# Patient Record
Sex: FEMALE | Race: WHITE | NOT HISPANIC OR LATINO
[De-identification: names, ages, dates, MRNs, and addresses within clinical notes are randomized per-mention and may not be internally consistent; named-entity substitution may affect disease eponyms.]

---

## 2018-11-08 ENCOUNTER — RESULT REVIEW (OUTPATIENT)
Age: 68
End: 2018-11-08

## 2019-04-04 ENCOUNTER — RESULT REVIEW (OUTPATIENT)
Age: 69
End: 2019-04-04

## 2020-05-10 ENCOUNTER — TRANSCRIPTION ENCOUNTER (OUTPATIENT)
Age: 70
End: 2020-05-10

## 2020-12-11 ENCOUNTER — RESULT REVIEW (OUTPATIENT)
Age: 70
End: 2020-12-11

## 2021-07-26 ENCOUNTER — TRANSCRIPTION ENCOUNTER (OUTPATIENT)
Age: 71
End: 2021-07-26

## 2021-08-10 ENCOUNTER — TRANSCRIPTION ENCOUNTER (OUTPATIENT)
Age: 71
End: 2021-08-10

## 2021-09-27 ENCOUNTER — TRANSCRIPTION ENCOUNTER (OUTPATIENT)
Age: 71
End: 2021-09-27

## 2021-12-01 ENCOUNTER — TRANSCRIPTION ENCOUNTER (OUTPATIENT)
Age: 71
End: 2021-12-01

## 2021-12-23 ENCOUNTER — TRANSCRIPTION ENCOUNTER (OUTPATIENT)
Age: 71
End: 2021-12-23

## 2021-12-27 ENCOUNTER — TRANSCRIPTION ENCOUNTER (OUTPATIENT)
Age: 71
End: 2021-12-27

## 2022-02-26 ENCOUNTER — TRANSCRIPTION ENCOUNTER (OUTPATIENT)
Age: 72
End: 2022-02-26

## 2022-06-14 ENCOUNTER — NON-APPOINTMENT (OUTPATIENT)
Age: 72
End: 2022-06-14

## 2022-08-15 ENCOUNTER — LABORATORY RESULT (OUTPATIENT)
Age: 72
End: 2022-08-15

## 2022-08-15 ENCOUNTER — RESULT REVIEW (OUTPATIENT)
Age: 72
End: 2022-08-15

## 2022-08-15 ENCOUNTER — APPOINTMENT (OUTPATIENT)
Dept: HEMATOLOGY ONCOLOGY | Facility: CLINIC | Age: 72
End: 2022-08-15

## 2022-08-15 VITALS
HEIGHT: 66 IN | BODY MASS INDEX: 18.16 KG/M2 | OXYGEN SATURATION: 99 % | SYSTOLIC BLOOD PRESSURE: 90 MMHG | RESPIRATION RATE: 18 BRPM | DIASTOLIC BLOOD PRESSURE: 47 MMHG | WEIGHT: 113 LBS | TEMPERATURE: 99.7 F | HEART RATE: 78 BPM

## 2022-08-15 DIAGNOSIS — Z86.39 PERSONAL HISTORY OF OTHER ENDOCRINE, NUTRITIONAL AND METABOLIC DISEASE: ICD-10-CM

## 2022-08-15 DIAGNOSIS — Z78.9 OTHER SPECIFIED HEALTH STATUS: ICD-10-CM

## 2022-08-15 PROBLEM — Z00.00 ENCOUNTER FOR PREVENTIVE HEALTH EXAMINATION: Status: ACTIVE | Noted: 2022-08-15

## 2022-08-15 PROCEDURE — 99205 OFFICE O/P NEW HI 60 MIN: CPT

## 2022-08-17 NOTE — RESULTS/DATA
[FreeTextEntry1] : 8/15/2022\par Accompanied labs (from 8/3/2022) reviewed. \par Hgb 10.1, MCV 97.5, WBC 8.1,  (reactive) \par Ferritin 65\par Iron 28\par TIBC 289 \par Iron sat 10%\par Transferrin 231 \par \par \par

## 2022-08-17 NOTE — HISTORY OF PRESENT ILLNESS
[de-identified] : (8/15/2022) /72 year-old Ms. STUART is seen in consult for anemia.Patient has ulcerative colitis that was under control until recently. Lately she has bloody stool and has been feeling weak and tired. She has taken oral iron for about two weeks. She will have a colonoscopy tomorrow. Clinically the patient does not look anemic. She has no other complaints but during the visit she became emotional.

## 2022-08-17 NOTE — ASSESSMENT
[FreeTextEntry1] : 72 year-old Ms. STUART is seen in consult for anemia. Patient has UC with a recent flare. She has been on PO iron for the past two weed that is currently off for a colonoscopy tomorrow. Clinically she does not look anemic. From labs he r anemia appears to be anemia of chronic disease. Will repeat the labs and plan on management based on lab results. \par \par \par [] Anemia\par - Anemia w/u labs \par - Management plan based on lab results\par - Will schedule for parenteral nutrient supplementation if indicated\par - RTC in a month \par \par [] ULcerative Colitis \par - May need parenteral nutritional supplement\par - F/U with GI\par

## 2022-09-09 ENCOUNTER — APPOINTMENT (OUTPATIENT)
Dept: HEMATOLOGY ONCOLOGY | Facility: CLINIC | Age: 72
End: 2022-09-09

## 2022-09-09 ENCOUNTER — RESULT REVIEW (OUTPATIENT)
Age: 72
End: 2022-09-09

## 2022-09-09 VITALS
DIASTOLIC BLOOD PRESSURE: 58 MMHG | HEART RATE: 66 BPM | TEMPERATURE: 96.5 F | BODY MASS INDEX: 18 KG/M2 | WEIGHT: 112 LBS | HEIGHT: 66 IN | RESPIRATION RATE: 18 BRPM | OXYGEN SATURATION: 97 % | SYSTOLIC BLOOD PRESSURE: 89 MMHG

## 2022-09-09 PROCEDURE — 99213 OFFICE O/P EST LOW 20 MIN: CPT

## 2022-09-09 NOTE — HISTORY OF PRESENT ILLNESS
[de-identified] : (8/15/2022) /72 year-old Ms. STUART is seen in consult for anemia.Patient has ulcerative colitis that was under control until recently. Lately she has bloody stool and has been feeling weak and tired. She has taken oral iron for about two weeks. She will have a colonoscopy tomorrow. Clinically the patient does not look anemic. She has no other complaints but during the visit she became emotional.  [de-identified] : 9/9/2022 \par Patient presents for a follow up. She has received 2 doses of Venofer in the interim. She states that her dizziness has improved following the iron infusion. She overall feels better. She also mentions that she notices blood in the stool in every bowel movement.

## 2022-09-09 NOTE — ASSESSMENT
[FreeTextEntry1] : 72 year-old Ms. STUART is seen in consult for anemia. Patient has UC with a recent flare. She has been on PO iron for the past two weeks that is currently off for a colonoscopy tomorrow. Clinically she does not look anemic. From labs her anemia appears to be anemia of chronic disease. Will repeat the labs and plan on management based on lab results. \par \par \par [] Anemia\par - Anemia w/u labs: unremarkable \par - Patient received 2 doses of IV iron, reports feeling better \par - Will obtain CBC, Ferritin today\par - RTC in a month \par \par [] ULcerative Colitis \par - May need long term parenteral nutritional supplement\par - F/U with GI\par

## 2022-09-09 NOTE — RESULTS/DATA
[FreeTextEntry1] : 9/9/2022\par No labs since Iron infusion. Will repeat labs today. \par \par 8/15/2022\par Accompanied labs (from 8/3/2022) reviewed. \par Hgb 10.1, MCV 97.5, WBC 8.1,  (reactive) \par Ferritin 65\par Iron 28\par TIBC 289 \par Iron sat 10%\par Transferrin 231 \par \par \par

## 2022-09-12 LAB — FERRITIN SERPL-MCNC: 277 NG/ML

## 2022-09-14 ENCOUNTER — TRANSCRIPTION ENCOUNTER (OUTPATIENT)
Age: 72
End: 2022-09-14

## 2022-09-16 ENCOUNTER — NON-APPOINTMENT (OUTPATIENT)
Age: 72
End: 2022-09-16

## 2022-09-19 ENCOUNTER — RESULT REVIEW (OUTPATIENT)
Age: 72
End: 2022-09-19

## 2022-09-19 ENCOUNTER — APPOINTMENT (OUTPATIENT)
Dept: HEMATOLOGY ONCOLOGY | Facility: CLINIC | Age: 72
End: 2022-09-19

## 2022-09-19 VITALS
RESPIRATION RATE: 18 BRPM | TEMPERATURE: 98.8 F | DIASTOLIC BLOOD PRESSURE: 52 MMHG | HEIGHT: 66 IN | WEIGHT: 112 LBS | SYSTOLIC BLOOD PRESSURE: 100 MMHG | HEART RATE: 72 BPM | OXYGEN SATURATION: 98 % | BODY MASS INDEX: 18 KG/M2

## 2022-09-19 PROCEDURE — 99213 OFFICE O/P EST LOW 20 MIN: CPT | Mod: 25

## 2022-09-19 PROCEDURE — 36415 COLL VENOUS BLD VENIPUNCTURE: CPT

## 2022-09-19 RX ORDER — UBIDECARENONE/VIT E ACET 100MG-5
CAPSULE ORAL
Refills: 0 | Status: ACTIVE | COMMUNITY

## 2022-09-19 RX ORDER — CALCIUM 250 MG
250 TABLET ORAL
Refills: 0 | Status: ACTIVE | COMMUNITY

## 2022-09-19 RX ORDER — GREEN TEA/HOODIA GORDONII 315-12.5MG
CAPSULE ORAL
Refills: 0 | Status: ACTIVE | COMMUNITY

## 2022-09-19 NOTE — HISTORY OF PRESENT ILLNESS
[de-identified] : 73 y/o female with long standing history of ulcerative colitis , for 10 yrs. , now with active flare\par Herre for anemia. \par Iron saturation

## 2022-09-19 NOTE — ASSESSMENT
[FreeTextEntry1] : 71 y/o with ulcerative colitis for last 10 yrs. on entyvio, now with worsening anemia. Recent flare with rectal bleeding, diarrhea  ( upto 8x/day)\par Hgb 9/15/22 -- 7.5\par IRon saturation --3/ Ferritin 82\par \par Mutifactorial anema: iron deficiency + chronic disease\par Start venofer 200mg IVSS   twice weeks for 5 doses\par repeat CBC next week

## 2022-09-21 LAB
FERRITIN SERPL-MCNC: 191 NG/ML
IRON SATN MFR SERPL: 5 %
IRON SERPL-MCNC: 14 UG/DL
TIBC SERPL-MCNC: 271 UG/DL
UIBC SERPL-MCNC: 257 UG/DL

## 2022-09-22 RX ORDER — USTEKINUMAB 90 MG/ML
90 INJECTION, SOLUTION SUBCUTANEOUS
Refills: 0 | Status: ACTIVE | COMMUNITY
Start: 2022-09-22

## 2022-10-17 ENCOUNTER — APPOINTMENT (OUTPATIENT)
Dept: HEMATOLOGY ONCOLOGY | Facility: CLINIC | Age: 72
End: 2022-10-17

## 2022-10-17 ENCOUNTER — NON-APPOINTMENT (OUTPATIENT)
Age: 72
End: 2022-10-17

## 2022-10-17 VITALS
TEMPERATURE: 98.4 F | HEIGHT: 66 IN | WEIGHT: 114 LBS | RESPIRATION RATE: 18 BRPM | DIASTOLIC BLOOD PRESSURE: 50 MMHG | OXYGEN SATURATION: 99 % | BODY MASS INDEX: 18.32 KG/M2 | SYSTOLIC BLOOD PRESSURE: 109 MMHG | HEART RATE: 60 BPM

## 2022-10-17 PROCEDURE — 99213 OFFICE O/P EST LOW 20 MIN: CPT

## 2022-10-18 ENCOUNTER — NON-APPOINTMENT (OUTPATIENT)
Age: 72
End: 2022-10-18

## 2022-10-18 LAB — FERRITIN SERPL-MCNC: 305 NG/ML

## 2022-10-21 LAB
IRON SATN MFR SERPL: 14 %
IRON SERPL-MCNC: 40 UG/DL
TIBC SERPL-MCNC: 286 UG/DL
UIBC SERPL-MCNC: 246 UG/DL

## 2022-10-21 NOTE — ASSESSMENT
[FreeTextEntry1] : 71 y/o with ulcerative colitis  x 10 years recently switched from entyvio to stellara, who presented with worsening rectal bleeding with associated diarrhea (up to 8x/d) and anemia (Hgb 7.5) and iron saturation 3/ Ferritin 82. Patient presents with mutifactorial anema: iron deficiency + chronic disease. She is now s/p completion of venofer 200mg IVSS 2x/week for 5 doses on 10/3/22. \par \par - Repeat labs today. CBC reviewed. Hgb 9.7 today. Iron studies are normal. \par - Patient with improvement to symptoms. Feels more energy. Notes persistent rectal bleeding but with decrease in frequency.  Continue with GI for UC.\par - Advised to call with any worsening symptoms of persistent bleeding, dizziness, lightheadedness, SOB. \par - Continue routine, age-appropriate, healthcare maintenance \par - History of present illness, review of systems, physical exam, and treatment plan discussed with Dr. De Jesus. \par - Office visit in 6 weeks or prn for new or worsening symptoms.

## 2022-10-21 NOTE — HISTORY OF PRESENT ILLNESS
[de-identified] : 73 y/o female with long standing history of ulcerative colitis x 10 years, now with active flare presenting with anemia s/p venofer last dose 10/3/22. \par  [de-identified] : Patient presents today for routine follow up of anemia s/p completion of 5 doses of Venofer on 10/3/22. She presents today feeling much better but notes some fatigue. She has been able to carry out more ADLs with more energy. She notes she was recently switched from Entyvio to Stelara for the management of her UC. She continues to have bleeding with bowel movements which have decreased to three times a day. She continues to follow with Dr. Chaudhry for this. She otherwise denies all other complaints.

## 2022-10-21 NOTE — REVIEW OF SYSTEMS
[Fatigue] : fatigue [Negative] : Allergic/Immunologic [FreeTextEntry2] : fatigue improving  [FreeTextEntry7] : + rectal bleeding, on treatment for UC

## 2022-10-21 NOTE — PHYSICAL EXAM
[Normal] : affect appropriate [Fully active, able to carry on all pre-disease performance without restriction] : Status 0 - Fully active, able to carry on all pre-disease performance without restriction [de-identified] : normal respiratory effort, no accessory muscle use, no audible breath sounds

## 2022-11-14 ENCOUNTER — LABORATORY RESULT (OUTPATIENT)
Age: 72
End: 2022-11-14

## 2022-11-15 ENCOUNTER — RESULT REVIEW (OUTPATIENT)
Age: 72
End: 2022-11-15

## 2022-11-15 ENCOUNTER — APPOINTMENT (OUTPATIENT)
Dept: HEMATOLOGY ONCOLOGY | Facility: CLINIC | Age: 72
End: 2022-11-15

## 2022-11-15 VITALS
DIASTOLIC BLOOD PRESSURE: 71 MMHG | RESPIRATION RATE: 18 BRPM | OXYGEN SATURATION: 99 % | HEIGHT: 66 IN | BODY MASS INDEX: 18.8 KG/M2 | TEMPERATURE: 97.6 F | HEART RATE: 56 BPM | SYSTOLIC BLOOD PRESSURE: 107 MMHG | WEIGHT: 117 LBS

## 2022-11-28 ENCOUNTER — APPOINTMENT (OUTPATIENT)
Dept: HEMATOLOGY ONCOLOGY | Facility: CLINIC | Age: 72
End: 2022-11-28

## 2022-11-28 VITALS
RESPIRATION RATE: 18 BRPM | HEIGHT: 66 IN | DIASTOLIC BLOOD PRESSURE: 53 MMHG | SYSTOLIC BLOOD PRESSURE: 105 MMHG | OXYGEN SATURATION: 95 % | BODY MASS INDEX: 18.96 KG/M2 | WEIGHT: 118 LBS | TEMPERATURE: 98.3 F | HEART RATE: 57 BPM

## 2022-11-28 PROCEDURE — 99213 OFFICE O/P EST LOW 20 MIN: CPT | Mod: 25

## 2022-11-28 PROCEDURE — 36415 COLL VENOUS BLD VENIPUNCTURE: CPT

## 2022-11-28 RX ORDER — FOLIC ACID 1 MG/1
1 TABLET ORAL
Qty: 90 | Refills: 0 | Status: ACTIVE | COMMUNITY
Start: 2022-06-09 | End: 1900-01-01

## 2022-11-28 RX ORDER — ASCORBIC ACID 500 MG
TABLET ORAL
Refills: 0 | Status: COMPLETED | COMMUNITY
End: 2022-11-28

## 2022-11-28 RX ORDER — VIT A/VIT C/VIT E/ZINC/COPPER 2148-113
TABLET ORAL
Refills: 0 | Status: ACTIVE | COMMUNITY

## 2022-11-28 RX ORDER — ATORVASTATIN CALCIUM 10 MG/1
10 TABLET, FILM COATED ORAL
Qty: 90 | Refills: 0 | Status: COMPLETED | COMMUNITY
Start: 2022-03-10

## 2022-11-28 RX ORDER — TRIAMCINOLONE ACETONIDE 5 MG/G
0.5 OINTMENT TOPICAL
Qty: 15 | Refills: 0 | Status: COMPLETED | COMMUNITY
Start: 2022-06-15

## 2022-11-28 RX ORDER — FLUOCINONIDE 0.5 MG/ML
0.05 SOLUTION TOPICAL
Qty: 60 | Refills: 0 | Status: COMPLETED | COMMUNITY
Start: 2022-11-09

## 2022-11-28 RX ORDER — IBUPROFEN 200 MG
600 CAPSULE ORAL
Refills: 0 | Status: ACTIVE | COMMUNITY

## 2022-11-28 RX ORDER — PREDNISONE 5 MG/1
5 TABLET ORAL
Qty: 40 | Refills: 0 | Status: COMPLETED | COMMUNITY
Start: 2022-06-17

## 2022-11-28 RX ORDER — ATORVASTATIN CALCIUM 10 MG/1
10 TABLET, FILM COATED ORAL DAILY
Refills: 0 | Status: ACTIVE | COMMUNITY

## 2022-12-05 ENCOUNTER — NON-APPOINTMENT (OUTPATIENT)
Age: 72
End: 2022-12-05

## 2022-12-07 NOTE — ASSESSMENT
[FreeTextEntry1] : 73 y/o with ulcerative colitis for last 10 yrs. on entyvio, now with worsening anemia. Recent flare with rectal bleeding, diarrhea  ( upto 8x/day)\par Hgb 9/15/22 -- 7.5\par IRon saturation --3/ Ferritin 82\par \par Mutifactorial anema: iron deficiency + chronic disease\par Monitor CBC/iron studies\par \par Thyromegaly\par \par RTC 3 months

## 2022-12-07 NOTE — HISTORY OF PRESENT ILLNESS
[de-identified] : 73 y/o female with long standing history of ulcerative colitis , for 10 yrs. , now with active flare\par Here for follow up of  anemia. \par

## 2022-12-17 ENCOUNTER — NON-APPOINTMENT (OUTPATIENT)
Age: 72
End: 2022-12-17

## 2023-03-07 ENCOUNTER — APPOINTMENT (OUTPATIENT)
Dept: HEMATOLOGY ONCOLOGY | Facility: CLINIC | Age: 73
End: 2023-03-07
Payer: MEDICARE

## 2023-03-07 ENCOUNTER — RESULT REVIEW (OUTPATIENT)
Age: 73
End: 2023-03-07

## 2023-03-07 VITALS
DIASTOLIC BLOOD PRESSURE: 64 MMHG | HEIGHT: 66 IN | SYSTOLIC BLOOD PRESSURE: 102 MMHG | RESPIRATION RATE: 18 BRPM | HEART RATE: 58 BPM | WEIGHT: 121 LBS | TEMPERATURE: 97.6 F | OXYGEN SATURATION: 98 % | BODY MASS INDEX: 19.44 KG/M2

## 2023-03-07 PROCEDURE — 99213 OFFICE O/P EST LOW 20 MIN: CPT | Mod: 25

## 2023-03-07 PROCEDURE — 36415 COLL VENOUS BLD VENIPUNCTURE: CPT

## 2023-03-07 RX ORDER — ASCORBIC ACID 125 MG
TABLET,CHEWABLE ORAL
Refills: 0 | Status: ACTIVE | COMMUNITY

## 2023-03-07 RX ORDER — CALCIUM CARBONATE 260MG(650)
TABLET,CHEWABLE ORAL
Refills: 0 | Status: ACTIVE | COMMUNITY

## 2023-03-07 NOTE — HISTORY OF PRESENT ILLNESS
[de-identified] : 71 y/o female with long standing history of ulcerative colitis , for 10 yrs. , now with active flare\par Here for follow up of  anemia. \par

## 2023-03-07 NOTE — PHYSICAL EXAM
[Normal] : affect appropriate [Restricted in physically strenuous activity but ambulatory and able to carry out work of a light or sedentary nature] : Status 1- Restricted in physically strenuous activity but ambulatory and able to carry out work of a light or sedentary nature, e.g., light house work, office work [de-identified] : + thyromegaly

## 2023-03-07 NOTE — ASSESSMENT
[FreeTextEntry1] : 71 y/o with ulcerative colitis for last 10 yrs. on entyvio, now with worsening anemia. Recent flare with rectal bleeding, diarrhea  ( upto 8x/day)\par Hgb 9/15/22 -- 7.5\par IRon saturation --3/ Ferritin 82\par \par Mutifactorial anema: iron deficiency + chronic disease\par Monitor CBC/iron studies\par \par Thyromegaly --- f/u with endocrine\par \par RTC 3 months\par \par Labs ordered, drawn in the office and reviewed.\par Next visit will order CBC with diff, CMP, iron studies/B12/ folate\par

## 2023-07-07 DIAGNOSIS — K51.90 ULCERATIVE COLITIS, UNSPECIFIED, W/OUT COMPLICATIONS: ICD-10-CM

## 2023-07-07 DIAGNOSIS — D64.9 ANEMIA, UNSPECIFIED: ICD-10-CM

## 2023-07-11 ENCOUNTER — APPOINTMENT (OUTPATIENT)
Dept: HEMATOLOGY ONCOLOGY | Facility: CLINIC | Age: 73
End: 2023-07-11
Payer: MEDICARE

## 2023-07-11 VITALS
RESPIRATION RATE: 16 BRPM | DIASTOLIC BLOOD PRESSURE: 64 MMHG | TEMPERATURE: 98.5 F | BODY MASS INDEX: 19.93 KG/M2 | HEIGHT: 66 IN | SYSTOLIC BLOOD PRESSURE: 106 MMHG | OXYGEN SATURATION: 96 % | WEIGHT: 124 LBS | HEART RATE: 49 BPM

## 2023-07-11 DIAGNOSIS — E61.1 IRON DEFICIENCY: ICD-10-CM

## 2023-07-11 LAB
FERRITIN SERPL-MCNC: 72 NG/ML
IRON SATN MFR SERPL: 27 %
IRON SERPL-MCNC: 87 UG/DL
TIBC SERPL-MCNC: 323 UG/DL
UIBC SERPL-MCNC: 236 UG/DL

## 2023-07-11 PROCEDURE — 99213 OFFICE O/P EST LOW 20 MIN: CPT

## 2023-07-18 PROBLEM — E61.1 IRON DEFICIENCY: Status: ACTIVE | Noted: 2022-08-15

## 2023-07-18 LAB
FERRITIN SERPL-MCNC: 55 NG/ML
FOLATE SERPL-MCNC: >20 NG/ML
IRON SATN MFR SERPL: 23 %
IRON SERPL-MCNC: 76 UG/DL
TIBC SERPL-MCNC: 337 UG/DL
UIBC SERPL-MCNC: 260 UG/DL
VIT B12 SERPL-MCNC: 513 PG/ML

## 2023-07-18 NOTE — REVIEW OF SYSTEMS
[Negative] : Allergic/Immunologic [Fatigue] : no fatigue [FreeTextEntry7] : Ulcerative colitis well-controlled on Stelara

## 2023-07-18 NOTE — ASSESSMENT
[FreeTextEntry1] : 71 y/o with ulcerative colitis for last 10 yrs. on entyvio, now with worsening anemia. Recent flare with rectal bleeding, diarrhea  ( upto 8x/day)\par Hgb 9/15/22 -- 7.5\par IRon saturation --3/ Ferritin 82\par \par \par Monitor CBC/iron studies\par \par Thyromegaly --- f/u with endocrine\par Plan:\par Mutifactorial anema: iron deficiency + chronic disease. Ulcerative colitis well-controlled on Stelara.\par Labs ordered, drawn in the office and reviewed.\par CBC with diff, CMP, iron studies/B12/ folate were normal. \par Continue routine, age-appropriate, healthcare maintenance \par Office visit in 12 weeks or prn for new or worsening symptoms. \par

## 2023-07-18 NOTE — HISTORY OF PRESENT ILLNESS
[de-identified] : 72 y/o female with long standing history of ulcerative colitis , for 10 yrs. , now with active flare\par Here for follow up of  anemia. \par  [de-identified] : She presents for iron deficiency in the setting of ulcerative colitis.  Her last Venofer was in September 2022.  She is taking Stelara and is doing mind body stress reduction. She feels it is under control and she s feeling better.  Sees Dr. Chaudhry, her GI,  in 2 weeks.

## 2023-07-18 NOTE — PHYSICAL EXAM
[Restricted in physically strenuous activity but ambulatory and able to carry out work of a light or sedentary nature] : Status 1- Restricted in physically strenuous activity but ambulatory and able to carry out work of a light or sedentary nature, e.g., light house work, office work [Normal] : affect appropriate [de-identified] : + thyromegaly

## 2023-10-20 ENCOUNTER — NON-APPOINTMENT (OUTPATIENT)
Age: 73
End: 2023-10-20

## 2023-12-30 ENCOUNTER — NON-APPOINTMENT (OUTPATIENT)
Age: 73
End: 2023-12-30

## 2024-03-19 ENCOUNTER — NON-APPOINTMENT (OUTPATIENT)
Age: 74
End: 2024-03-19

## 2024-04-12 LAB
FERRITIN SERPL-MCNC: 102 NG/ML
IRON SATN MFR SERPL: 18 %
IRON SERPL-MCNC: 61 UG/DL
TIBC SERPL-MCNC: 336 UG/DL
UIBC SERPL-MCNC: 275 UG/DL

## 2024-07-28 ENCOUNTER — NON-APPOINTMENT (OUTPATIENT)
Age: 74
End: 2024-07-28

## 2024-08-06 ENCOUNTER — NON-APPOINTMENT (OUTPATIENT)
Age: 74
End: 2024-08-06

## 2024-08-12 ENCOUNTER — NON-APPOINTMENT (OUTPATIENT)
Age: 74
End: 2024-08-12

## 2024-08-19 ENCOUNTER — NON-APPOINTMENT (OUTPATIENT)
Age: 74
End: 2024-08-19

## 2024-08-20 ENCOUNTER — APPOINTMENT (OUTPATIENT)
Dept: PULMONOLOGY | Facility: CLINIC | Age: 74
End: 2024-08-20
Payer: MEDICARE

## 2024-08-20 VITALS
TEMPERATURE: 98 F | OXYGEN SATURATION: 92 % | HEIGHT: 66 IN | HEART RATE: 56 BPM | BODY MASS INDEX: 19.29 KG/M2 | SYSTOLIC BLOOD PRESSURE: 111 MMHG | WEIGHT: 120 LBS | DIASTOLIC BLOOD PRESSURE: 72 MMHG

## 2024-08-20 DIAGNOSIS — J18.9 PNEUMONIA, UNSPECIFIED ORGANISM: ICD-10-CM

## 2024-08-20 DIAGNOSIS — R09.82 POSTNASAL DRIP: ICD-10-CM

## 2024-08-20 DIAGNOSIS — R06.09 OTHER FORMS OF DYSPNEA: ICD-10-CM

## 2024-08-20 PROCEDURE — 94729 DIFFUSING CAPACITY: CPT

## 2024-08-20 PROCEDURE — 94060 EVALUATION OF WHEEZING: CPT

## 2024-08-20 PROCEDURE — 99204 OFFICE O/P NEW MOD 45 MIN: CPT | Mod: 25

## 2024-08-20 PROCEDURE — 94727 GAS DIL/WSHOT DETER LNG VOL: CPT

## 2024-08-20 NOTE — PHYSICAL EXAM
[No Acute Distress] : no acute distress [Normal Appearance] : normal appearance [Normal S1, S2] : normal s1, s2 [No Resp Distress] : no resp distress [Clear to Auscultation Bilaterally] : clear to auscultation bilaterally [No Abnormalities] : no abnormalities [Normal Gait] : normal gait [No Clubbing] : no clubbing [No Focal Deficits] : no focal deficits [Oriented x3] : oriented x3 [Normal Affect] : normal affect

## 2024-08-20 NOTE — REVIEW OF SYSTEMS
[Fever] : fever [Postnasal Drip] : postnasal drip [Sinus Problems] : sinus problems [Cough] : cough [SOB on Exertion] : sob on exertion [Negative] : Dermatologic

## 2024-08-20 NOTE — HISTORY OF PRESENT ILLNESS
[TextBox_4] : 74 year old female with anemia came in for initial evaluation CXR seen, my read: blunting of costophrenic angles, rib calcifications Increased opacification posterior RtLL  Last month she started to feel post nasal drip. She was well until last month. She used Flonase with no improvement. Had fever 102 2 weeks ago. She then went to urgent care and had a CXR. She was started on ABX for 7 days with improvement. Sputum was yellow, no hemoptysis. Has mild dyspnea on exertion. Denies wheezing.  Ex smoker. Has 20 pack year. Quit 20 years ago. FHX: denies. Now she feels itchiness in the back of the throat.

## 2024-09-04 ENCOUNTER — TRANSCRIPTION ENCOUNTER (OUTPATIENT)
Age: 74
End: 2024-09-04

## 2024-09-24 ENCOUNTER — RESULT REVIEW (OUTPATIENT)
Age: 74
End: 2024-09-24

## 2024-10-01 ENCOUNTER — APPOINTMENT (OUTPATIENT)
Dept: PULMONOLOGY | Facility: CLINIC | Age: 74
End: 2024-10-01
Payer: MEDICARE

## 2024-10-01 VITALS
HEIGHT: 66 IN | DIASTOLIC BLOOD PRESSURE: 56 MMHG | OXYGEN SATURATION: 96 % | SYSTOLIC BLOOD PRESSURE: 101 MMHG | BODY MASS INDEX: 19.29 KG/M2 | HEART RATE: 57 BPM | WEIGHT: 120 LBS | TEMPERATURE: 97 F

## 2024-10-01 DIAGNOSIS — R06.09 OTHER FORMS OF DYSPNEA: ICD-10-CM

## 2024-10-01 DIAGNOSIS — J18.9 PNEUMONIA, UNSPECIFIED ORGANISM: ICD-10-CM

## 2024-10-01 DIAGNOSIS — R09.82 POSTNASAL DRIP: ICD-10-CM

## 2024-10-01 PROCEDURE — 99214 OFFICE O/P EST MOD 30 MIN: CPT

## 2024-10-01 NOTE — HISTORY OF PRESENT ILLNESS
[TextBox_4] : 74 year old female with recent pneumonia, dyspnea and post nasal drip came for f/u Recent CXR seen, my read: resolution of the RtLL infiltrate, no effusions. Hyperinflated lungs PFT's with mild airways obstruction based on the flow volume loop, moderate decrease DLCO  Feels back at baseline No cough, no dyspnea CXR results d/w her She is concerned about the Ao calcifications since she has a strong family Hx of cardiac diseases. Ex smoker No exercise limitations

## 2024-11-15 ENCOUNTER — NON-APPOINTMENT (OUTPATIENT)
Age: 74
End: 2024-11-15

## 2024-11-15 ENCOUNTER — APPOINTMENT (OUTPATIENT)
Dept: HEART AND VASCULAR | Facility: CLINIC | Age: 74
End: 2024-11-15
Payer: MEDICARE

## 2024-11-15 VITALS
WEIGHT: 125 LBS | DIASTOLIC BLOOD PRESSURE: 68 MMHG | BODY MASS INDEX: 20.09 KG/M2 | HEIGHT: 66 IN | SYSTOLIC BLOOD PRESSURE: 118 MMHG

## 2024-11-15 DIAGNOSIS — I34.0 NONRHEUMATIC MITRAL (VALVE) INSUFFICIENCY: ICD-10-CM

## 2024-11-15 DIAGNOSIS — Z12.2 ENCOUNTER FOR SCREENING FOR MALIGNANT NEOPLASM OF RESPIRATORY ORGANS: ICD-10-CM

## 2024-11-15 DIAGNOSIS — I70.91 GENERALIZED ATHEROSCLEROSIS: ICD-10-CM

## 2024-11-15 DIAGNOSIS — I70.0 ATHEROSCLEROSIS OF AORTA: ICD-10-CM

## 2024-11-15 DIAGNOSIS — Z87.891 PERSONAL HISTORY OF NICOTINE DEPENDENCE: ICD-10-CM

## 2024-11-15 DIAGNOSIS — I65.23 OCCLUSION AND STENOSIS OF BILATERAL CAROTID ARTERIES: ICD-10-CM

## 2024-11-15 DIAGNOSIS — R09.89 OTHER SPECIFIED SYMPTOMS AND SIGNS INVOLVING THE CIRCULATORY AND RESPIRATORY SYSTEMS: ICD-10-CM

## 2024-11-15 DIAGNOSIS — R06.09 OTHER FORMS OF DYSPNEA: ICD-10-CM

## 2024-11-15 DIAGNOSIS — Z82.49 FAMILY HISTORY OF ISCHEMIC HEART DISEASE AND OTHER DISEASES OF THE CIRCULATORY SYSTEM: ICD-10-CM

## 2024-11-15 PROCEDURE — 99205 OFFICE O/P NEW HI 60 MIN: CPT

## 2024-11-15 PROCEDURE — G2211 COMPLEX E/M VISIT ADD ON: CPT

## 2024-11-15 PROCEDURE — 93000 ELECTROCARDIOGRAM COMPLETE: CPT

## 2024-11-27 ENCOUNTER — RESULT REVIEW (OUTPATIENT)
Age: 74
End: 2024-11-27

## 2024-12-13 ENCOUNTER — RESULT REVIEW (OUTPATIENT)
Age: 74
End: 2024-12-13

## 2024-12-16 ENCOUNTER — RESULT REVIEW (OUTPATIENT)
Age: 74
End: 2024-12-16

## 2024-12-19 ENCOUNTER — APPOINTMENT (OUTPATIENT)
Dept: HEART AND VASCULAR | Facility: CLINIC | Age: 74
End: 2024-12-19
Payer: MEDICARE

## 2024-12-19 ENCOUNTER — NON-APPOINTMENT (OUTPATIENT)
Age: 74
End: 2024-12-19

## 2024-12-19 VITALS
DIASTOLIC BLOOD PRESSURE: 62 MMHG | OXYGEN SATURATION: 99 % | SYSTOLIC BLOOD PRESSURE: 100 MMHG | HEIGHT: 66 IN | HEART RATE: 68 BPM | BODY MASS INDEX: 19.61 KG/M2 | WEIGHT: 122 LBS

## 2024-12-19 DIAGNOSIS — R09.89 OTHER SPECIFIED SYMPTOMS AND SIGNS INVOLVING THE CIRCULATORY AND RESPIRATORY SYSTEMS: ICD-10-CM

## 2024-12-19 DIAGNOSIS — I65.21 OCCLUSION AND STENOSIS OF RIGHT CAROTID ARTERY: ICD-10-CM

## 2024-12-19 DIAGNOSIS — I25.10 ATHEROSCLEROTIC HEART DISEASE OF NATIVE CORONARY ARTERY W/OUT ANGINA PECTORIS: ICD-10-CM

## 2024-12-19 DIAGNOSIS — J43.9 EMPHYSEMA, UNSPECIFIED: ICD-10-CM

## 2024-12-19 PROCEDURE — 93000 ELECTROCARDIOGRAM COMPLETE: CPT

## 2024-12-19 PROCEDURE — G2211 COMPLEX E/M VISIT ADD ON: CPT

## 2024-12-19 PROCEDURE — 99214 OFFICE O/P EST MOD 30 MIN: CPT

## 2025-03-20 ENCOUNTER — APPOINTMENT (OUTPATIENT)
Dept: HEART AND VASCULAR | Facility: CLINIC | Age: 75
End: 2025-03-20
Payer: MEDICARE

## 2025-03-20 VITALS
BODY MASS INDEX: 20.83 KG/M2 | DIASTOLIC BLOOD PRESSURE: 62 MMHG | HEART RATE: 66 BPM | HEIGHT: 65 IN | WEIGHT: 125 LBS | SYSTOLIC BLOOD PRESSURE: 102 MMHG

## 2025-03-20 DIAGNOSIS — I65.21 OCCLUSION AND STENOSIS OF RIGHT CAROTID ARTERY: ICD-10-CM

## 2025-03-20 DIAGNOSIS — I34.0 NONRHEUMATIC MITRAL (VALVE) INSUFFICIENCY: ICD-10-CM

## 2025-03-20 DIAGNOSIS — J43.9 EMPHYSEMA, UNSPECIFIED: ICD-10-CM

## 2025-03-20 DIAGNOSIS — I25.10 ATHEROSCLEROTIC HEART DISEASE OF NATIVE CORONARY ARTERY W/OUT ANGINA PECTORIS: ICD-10-CM

## 2025-03-20 PROCEDURE — 99214 OFFICE O/P EST MOD 30 MIN: CPT

## 2025-03-20 PROCEDURE — 93000 ELECTROCARDIOGRAM COMPLETE: CPT

## 2025-03-20 PROCEDURE — G2211 COMPLEX E/M VISIT ADD ON: CPT

## 2025-03-20 RX ORDER — USTEKINUMAB 45 MG/.5ML
45 INJECTION, SOLUTION SUBCUTANEOUS
Refills: 0 | Status: ACTIVE | COMMUNITY

## 2025-03-20 RX ORDER — ATORVASTATIN CALCIUM 20 MG/1
20 TABLET, FILM COATED ORAL
Refills: 0 | Status: ACTIVE | COMMUNITY

## 2025-03-20 RX ORDER — SENNOSIDES 8.6 MG
TABLET ORAL
Refills: 0 | Status: ACTIVE | COMMUNITY

## 2025-03-27 ENCOUNTER — APPOINTMENT (OUTPATIENT)
Dept: PULMONOLOGY | Facility: CLINIC | Age: 75
End: 2025-03-27

## 2025-04-08 ENCOUNTER — APPOINTMENT (OUTPATIENT)
Dept: PULMONOLOGY | Facility: CLINIC | Age: 75
End: 2025-04-08
Payer: MEDICARE

## 2025-04-08 VITALS
SYSTOLIC BLOOD PRESSURE: 118 MMHG | HEART RATE: 67 BPM | BODY MASS INDEX: 20.83 KG/M2 | DIASTOLIC BLOOD PRESSURE: 35 MMHG | OXYGEN SATURATION: 95 % | WEIGHT: 125 LBS | TEMPERATURE: 98 F | HEIGHT: 65 IN

## 2025-04-08 DIAGNOSIS — R09.82 POSTNASAL DRIP: ICD-10-CM

## 2025-04-08 DIAGNOSIS — R06.09 OTHER FORMS OF DYSPNEA: ICD-10-CM

## 2025-04-08 DIAGNOSIS — R93.89 ABNORMAL FINDINGS ON DIAGNOSTIC IMAGING OF OTHER SPECIFIED BODY STRUCTURES: ICD-10-CM

## 2025-04-08 PROCEDURE — 99214 OFFICE O/P EST MOD 30 MIN: CPT

## 2025-06-05 ENCOUNTER — NON-APPOINTMENT (OUTPATIENT)
Age: 75
End: 2025-06-05